# Patient Record
Sex: FEMALE | Race: WHITE | Employment: UNEMPLOYED
[De-identification: names, ages, dates, MRNs, and addresses within clinical notes are randomized per-mention and may not be internally consistent; named-entity substitution may affect disease eponyms.]

---

## 2022-08-05 ENCOUNTER — NURSE TRIAGE (OUTPATIENT)
Dept: OTHER | Facility: CLINIC | Age: 62
End: 2022-08-05

## 2022-08-05 NOTE — TELEPHONE ENCOUNTER
Received call from Tanmay Arguello at [facility] with [employer]. Subjective: Caller states \"I had surgery back in March on my L ear. I can't hear out of that ear, am still having headaches, and I'm having balance problems. I feel sleepy all the time and have some lightheadedness all the time. I have no balance and often feel like I'm going to fall. The surgery was supposed to make it better, but it has not. \"     Onset:  has been going on for a long time, even before the surgery in March, and the surgery didn't help the problem and I'm still having the same problems. What has been tried: pt had surgery to try to obtain help for the       Recommended disposition: See PCP within 24 Hours    Care advice provided, patient verbalizes understanding; denies any other questions or concerns; instructed to call back for any new or worsening symptoms. [place], Service Expert, to make an appt for pt. Attention Provider: Thank you for allowing me to participate in the care of your patient. The patient was connected to triage in response to information provided to the ECC. Please do not respond through this encounter as the response is not directed to a shared pool.       Reason for Disposition   [1] Decreased hearing AND [2] taking medication that can damage hearing (i.e., gentamycin, tobramycin, furosemide, ethacrynic acid, cisplatin, quinidine)    Protocols used: Hearing Loss or Change-ADULT-AH

## 2022-08-24 ENCOUNTER — OFFICE VISIT (OUTPATIENT)
Dept: NEUROLOGY | Age: 62
End: 2022-08-24
Payer: COMMERCIAL

## 2022-08-24 VITALS
OXYGEN SATURATION: 98 % | DIASTOLIC BLOOD PRESSURE: 80 MMHG | SYSTOLIC BLOOD PRESSURE: 130 MMHG | HEIGHT: 63 IN | BODY MASS INDEX: 28.53 KG/M2 | WEIGHT: 161 LBS | HEART RATE: 88 BPM

## 2022-08-24 DIAGNOSIS — R42 VERTIGO: Primary | ICD-10-CM

## 2022-08-24 PROCEDURE — 99204 OFFICE O/P NEW MOD 45 MIN: CPT | Performed by: PSYCHIATRY & NEUROLOGY

## 2022-08-24 RX ORDER — CITALOPRAM 40 MG/1
40 TABLET, FILM COATED ORAL DAILY
COMMUNITY

## 2022-08-24 RX ORDER — CETIRIZINE HCL 10 MG
TABLET ORAL
COMMUNITY

## 2022-08-24 RX ORDER — MIRTAZAPINE 15 MG/1
TABLET, FILM COATED ORAL
COMMUNITY

## 2022-08-24 RX ORDER — ATORVASTATIN CALCIUM 40 MG/1
40 TABLET, FILM COATED ORAL DAILY
COMMUNITY
End: 2022-09-15

## 2022-08-24 RX ORDER — LEVOTHYROXINE SODIUM 125 UG/1
CAPSULE ORAL
COMMUNITY

## 2022-08-24 RX ORDER — LORAZEPAM 0.5 MG/1
TABLET ORAL
COMMUNITY

## 2022-08-24 RX ORDER — PHENOL/SODIUM PHENOLATE
20 AEROSOL, SPRAY (ML) MUCOUS MEMBRANE DAILY
COMMUNITY
End: 2022-09-15

## 2022-08-24 NOTE — PROGRESS NOTES
NEUROLOGY NEW PATIENT OFFICE CONSULTATION      8/24/2022    RE: Aranza Womack         1960      REFERRED BY:  Other, None, PA        CHIEF COMPLAINT:  This is Aranza Womack is a 63 y.o. female who had concerns including Dizziness. HPI:     Christmas 2021, patient started developing dizziness, hearing loss and ringing of the L ear. Patient saw ENT where CT head showed a cholesteatoma    In March 2022, in [facility], patient had L ear surgery but did not follow up due to moving to the [facility]. Patient continues to have dizziness, ringing in the ear and L hearing loss. (+) headache. ROS  (-) fever  (-) rash  All other systems reviewed and are negative    Past Medical Hx  No past medical history on file. Social Hx       Family Hx  No family history on file. ALLERGIES  Not on File    CURRENT MEDS          PREVIOUS WORKUP: (reviewed)  IMAGING:    CT Results (recent):  No results found for this or any previous visit. MRI Results (recent):  No results found for this or any previous visit. IR Results (recent):  No results found for this or any previous visit. VAS/US Results (recent):  No results found for this or any previous visit. LABS (reviewed)  No results found for this or any previous visit. Physical Exam:   Visit Vitals  /80   Pulse 88   Ht 5' 3\" (1.6 m)   Wt 73 kg (161 lb)   SpO2 98%   BMI 28.52 kg/m²     General:  Alert, cooperative, no distress. Head:  Normocephalic, without obvious abnormality, atraumatic. Eyes:  Conjunctivae/corneas clear. Lungs:  Heart:   Non labored breathing  Regular rate and rhythm, no carotid bruits   Abdomen:   Soft, non-distended   Extremities: Extremities normal, atraumatic, no cyanosis or edema. Pulses: 2+ and symmetric all extremities. Skin: Skin color, texture, turgor normal. No rashes or lesions.   Neurologic Exam     Gen: Attention normal             Language: naming, repetition, fluency normal             Memory: intact recent and remote memory  Cranial Nerves:  I: smell Not tested   II: visual fields Full to confrontation   II: pupils Equal, round, reactive to light   II: optic disc No papilledema   III,VII: ptosis none   III,IV,VI: extraocular muscles  Full ROM   V: mastication normal   V: facial light touch sensation  normal   VII: facial muscle function   symmetric   VIII: hearing Decrease L ear   IX: soft palate elevation  normal   XI: trapezius strength  5/5   XI: sternocleidomastoid strength 5/5   XI: neck flexion strength  5/5   XII: tongue  midline     Motor: normal bulk and tone, no tremor              Strength: 5/5 all four extremities  Sensory: intact to LT, PP, vibration, and JPS  Reflexes: 2+ throughout; Down going toes  Coordination: Good FTN and HTS  Gait: normal gait including tandem            Impression:     Ritika Romero is a 63 y.o. female from [facility] who  has no past medical history on file. who [place] 2021, started developing severe dizziness, hearing loss and ringing of the L ear. Patient saw ENT where CT head showed a cholesteatoma. In March 2022, in [facility], patient had L ear surgery but did not follow up due to moving to the [facility]. Patient continues to have dizziness, ringing in the ear and L hearing loss. Considerations include residual symptoms due to cholesteatoma. Evaluate for Meniere's disease and vestibula schwannoma    RECOMMENDATIONS  1. I had a long discussion with patient and friend. Discussed diagnosis, prognosis, pathophysiology and available treatment. Reviewed test results. All questions were answered. 2. Reviewed medical records from outside  3. MRI brain with DOC to look for vestibular mass lesion  4. Referral to ENT Dr Ivana Rodriguez and Dispositions    Return for review of results.             Thank you for the consultation      Staci Galvan MD  Diplomate, American Board of Psychiatry and Neurology  Diplomate, Neuromuscular Medicine  Diplomate, American Board of Electrodiagnostic Medicine        CC: Other, None, PA  Fax: None

## 2022-09-08 ENCOUNTER — HOSPITAL ENCOUNTER (OUTPATIENT)
Dept: MRI IMAGING | Age: 62
Discharge: HOME | End: 2022-09-08
Attending: PSYCHIATRY & NEUROLOGY
Payer: MEDICAID

## 2022-09-08 DIAGNOSIS — R42 VERTIGO: ICD-10-CM

## 2022-09-08 PROCEDURE — 74011250636 HC RX REV CODE- 250/636: Performed by: PSYCHIATRY & NEUROLOGY

## 2022-09-08 PROCEDURE — A9576 INJ PROHANCE MULTIPACK: HCPCS | Performed by: PSYCHIATRY & NEUROLOGY

## 2022-09-08 PROCEDURE — 70553 MRI BRAIN STEM W/O & W/DYE: CPT

## 2022-09-08 RX ADMIN — GADOTERIDOL 15 ML: 279.3 INJECTION, SOLUTION INTRAVENOUS at 08:58

## 2022-09-12 ENCOUNTER — TELEPHONE (OUTPATIENT)
Dept: NEUROLOGY | Age: 62
End: 2022-09-12

## 2022-09-12 NOTE — TELEPHONE ENCOUNTER
----- Message from Cortney Gustafson MD sent at 9/9/2022  4:47 PM EDT -----  Schedule follow up to discuss results    ----- Message -----  From: Johnny Naqvi Results In  Sent: 9/9/2022   1:53 PM EDT  To: Cortney Gustafson MD

## 2022-09-12 NOTE — TELEPHONE ENCOUNTER
Called pt/ Tony (hipaa verified) Inform them both that a follow up appt is needed to discuss MRI results.  Verbalizes understanding and is scheduled w/ Dr. Renan Chacko on Thursday 9/15/22 at 2:20pm.

## 2022-09-14 NOTE — PROGRESS NOTES
Neurology Progress Note    Patient ID:  Wyatt Vick  [id number]  63 y.o.  1960      Subjective:   History:  Wyatt Vick is a 63 y.o. female from [facility] who  has no past medical history on file. who [place] 2021, started developing severe dizziness, hearing loss and ringing of the L ear. Patient saw ENT where CT head showed a cholesteatoma. In March 2022, in [facility], patient had L ear surgery but did not follow up due to moving to the [facility]. Patient continues to have dizziness, ringing in the ear and L hearing loss. Still has same issues. Scheduled to see ENT Dr Mag Buitrago in Oct.    ROS:  Per HPI-  Otherwise the remainder of ROS was negative    Social Hx  Social History     Socioeconomic History    Marital status: SINGLE       Meds:  Current Outpatient Medications on File Prior to Visit   Medication Sig Dispense Refill    montelukast (Singulair) 10 mg tablet Take 10 mg by mouth daily. cetirizine (ZYRTEC) 10 mg tablet Take  by mouth.      citalopram (CELEXA) 40 mg tablet Take 40 mg by mouth daily. mirtazapine (REMERON) 15 mg tablet Take  by mouth nightly. LORazepam (ATIVAN) 0.5 mg tablet Take  by mouth.      multivitamin (VITAMIN DAILY PO) Take  by mouth. flaxseed oil (OMEGA 3 PO) Take  by mouth. Levothyroxine 125 mcg cap Take  by mouth. atorvastatin (LIPITOR) 40 mg tablet Take 40 mg by mouth daily. (Patient not taking: Reported on 9/15/2022)      Omeprazole delayed release (PRILOSEC D/R) 20 mg tablet Take 20 mg by mouth daily. (Patient not taking: Reported on 9/15/2022)       No current facility-administered medications on file prior to visit. Imaging:    CT Results (recent):  No results found for this or any previous visit.       MRI Results (recent):  Results from [place] encounter on 09/08/22    MRI BRAIN W WO CONT    Narrative  EXAM:  MRI BRAIN W WO CONT    INDICATION:    63-year-old female with left tinnitus, hearing loss and  dizziness; evaluate for L schwannoma or Meniere's; history of L cholesteatoma  surgery    COMPARISON:  None. CONTRAST: 15 ml ProHance. TECHNIQUE:  Multiplanar multisequence acquisition of the brain prior to and following IV  administration, including dedicated internal auditory canal protocol. FINDINGS:  No significant parenchymal signal abnormality. There is no acute infarct,  hemorrhage, extra-axial fluid collection, or mass effect. The ventricles are  normal in size and position. There is no cerebellar tonsillar herniation. Expected arterial flow-voids are present. RIGHT: The cerebellopontine angle and internal auditory canal are free of mass  or enhancing lesion. There are 4 nerves within the internal auditory canal. The  cochlea and other inner structures are grossly normal. Middle ear cavity is free  of fluid. No abnormal diffusion restriction in the petrous apex. External  auditory canal is unremarkable. LEFT: Sequelae of remote canal wall up mastoidectomy with enhancing soft tissue  filling of the mastoidectomy cavity. The cerebellopontine angle and internal  auditory canal are free of mass or enhancing lesion. There are 4 nerves within  the internal auditory canal. The cochlea is grossly normal. The superior and  posterior semicircular canals and not definitively visualized; Low T2 signal  abnormality of the partially visualized lateral semicircular canal with  associated contrast enhancement, may present postsurgical changes versus  calcific labyrinthitis. No obvious enlargement of the endolymphatic duct/sac to  suggest severe Meniere's disease. Middle ear cavity is free of fluid. No  abnormal diffusion restriction in the petrous apex. External auditory canal is  unremarkable. The paranasal sinuses, mastoid air cells, and middle ears are clear. The orbital  contents are within normal limits. No significant osseous or scalp lesions are  identified. Impression  1.  Sequelae status post left mastoidectomy. 2. Left lateral semicircular canal signal abnormality and enhancement may  represent post surgical changes versus calcific labyrinthitis. 3. No CP angle or IAC mass or enhancing lesion. No findings to suggest distended  endolymphatic duct/sac. 4. Right IAC within normal limits. 5. No acute intracranial abnormality. IR Results (recent):  No results found for this or any previous visit. VAS/US Results (recent):  No results found for this or any previous visit. Reviewed records in [facility] and [facility] tab today    Lab Review     No visits with results within 3 Month(s) from this visit. Latest known visit with results is:   No results found for any previous visit. Objective:       Exam:  Visit Vitals  /80   Pulse 82   Ht 5' 2\" (1.575 m)   SpO2 97%   BMI 29.45 kg/m²     Gen: Awake, alert, follows commands  Appropriate appearance, normal speech. Oriented to all spheres. No visual field defect on confrontation exam.  Full eyes movement, with no nystagmus, no diplopia, no ptosis. Normal gag and swallow. All remaining cranial nerves were normal  Motor function: 5/5 in all extremities  Sensory: intact to LT, PP and JPS  DTRs ++ in all extremities, (-) Babinski  Good FTN and HTS   Gait: Normal    Assessment:       ICD-10-CM ICD-9-CM    1. Vertigo  R42 780.4           Considerations include residual symptoms due to cholesteatoma. Evaluate for Meniere's disease     MRI brain: 1. Sequelae status post left mastoidectomy. 2. Left lateral semicircular canal signal abnormality and enhancement may represent post surgical changes versus calcific labyrinthitis. 3. No CP angle or IAC mass or enhancing lesion. No findings to suggest distended endolymphatic duct/sac. Plan:     1.  I had a long discussion with patient and friend. Discussed diagnosis, prognosis, pathophysiology and available treatment. Reviewed test results. All questions were answered.   2. Discussed MRI brain results  3. Scheduled to see ENT Dr [address] in Oct.         Follow-up and Dispositions    Return if symptoms worsen or fail to improve.                Rodolfo Peterson MD  Diplomate, American Board of Psychiatry and Neurology  Diplomate, Neuromuscular Medicine  Diplomate, American Board of Electrodiagnostic Medicine

## 2022-09-15 ENCOUNTER — OFFICE VISIT (OUTPATIENT)
Dept: NEUROLOGY | Age: 62
End: 2022-09-15
Payer: MEDICAID

## 2022-09-15 VITALS
OXYGEN SATURATION: 97 % | HEIGHT: 62 IN | BODY MASS INDEX: 29.45 KG/M2 | DIASTOLIC BLOOD PRESSURE: 80 MMHG | SYSTOLIC BLOOD PRESSURE: 120 MMHG | HEART RATE: 82 BPM

## 2022-09-15 DIAGNOSIS — R42 VERTIGO: Primary | ICD-10-CM

## 2022-09-15 PROCEDURE — 99214 OFFICE O/P EST MOD 30 MIN: CPT | Performed by: PSYCHIATRY & NEUROLOGY

## 2022-09-15 RX ORDER — MONTELUKAST SODIUM 10 MG/1
10 TABLET ORAL DAILY
COMMUNITY